# Patient Record
(demographics unavailable — no encounter records)

---

## 2024-12-05 NOTE — HISTORY OF PRESENT ILLNESS
[FreeTextEntry1] : Ms. Chaparro is a 31-year-old female who presents to the clinic for follow-up on obesity class 1 and polycystic ovary syndrome.   OBESITY CLASS 1 / PCOS HISTORY She reported having a ~50 lbs weight gain over 2023. Additionally, she complained of mild facial hair around her chin. She had experienced some menstrual irregularities in early 2023 (delays in her periods of about 6-10 days), which later resolved. She denied having any vision changes, galactorrhea, or changes in the size of her shoes or rings. Labs were remarkable for mildly elevated total testosterone of 56, with normal FSH, LH, prolactin, 17-OH progesterone, DHEA-S, TSH, and lipid profile. A 1-mg dexamethasone suppression test ruled out Cushing's disease. Therefore, the impression was that her symptoms were likely secondary to polycystic ovary syndrome. She decided to have laser hair removal to help with facial hair. She was eventually started on Wegovy as a pharmacotherapy to help with weight loss along with diet and lifestyle changes.   10/2/24 - The patient reports slow but steady weight loss since the last visit, with an additional 4-pound loss on the current 1 mg dose of Wegovy. She has lost a total of 10 lbs since starting the medication at the beginning of the year (after a brief interruption last year due to a shortage). She feels her body has adjusted to the dose, and her weight loss has plateaued. She has 3 more injections of the 1 mg dose remaining before needing a refill. Additionally, she reports increased physical activity, including walking and taking the train instead of driving. She has continued making dietary modifications and limiting food portions. Denies having any side effects from the medication.   12/5/24 - Ms. Chaparro presents for a follow-up regarding weight management, noting her weight has remained somewhat stable since her last appointment. During her previous visit, her Wegovy dose was increased from 1 mg to 1.7 mg weekly. However, due to traveling for a couple of weeks, she did not begin the new dose and continued taking the old dose of 1 mg weekly [place]. She has not yet picked up the higher dose of the medication. Despite the travel, her weight has decreased by 4 lbs since the October visit. She reports a decreased appetite, which she attributes to stress related to an impending job loss. Diet has not changed significantly.

## 2024-12-05 NOTE — REVIEW OF SYSTEMS
[Recent Weight Loss (___ Lbs)] : recent weight loss: [unfilled] lbs [Fatigue] : no fatigue [Fever] : no fever [Chills] : no chills [Chest Pain] : no chest pain [Shortness Of Breath] : no shortness of breath [Nausea] : no nausea [Vomiting] : no vomiting

## 2024-12-05 NOTE — ASSESSMENT
[FreeTextEntry1] : # Obesity class 1 - Ms. Chaparro has shown slow but steady progress with weight loss, losing 4 lbs since the last visit, despite not starting the higher medication dose and having an inconsistent diet due to stress. Since starting the Wegovy in January 2024, she has achieved a 14-pound weight loss and her BMI has decreased from 34 to 31.  - I recommended for her to continue with the current management approach, emphasizing slow, sustained weight loss.  - Increase Wegovy to 1.7 mg as previously planned and monitor for any side effects.  - If weight loss plateaus at the maximum Wegovy dose, will consider switching to Zepbound as a stronger alternative. - Check thyroid function test and lipid profile.   RTC in 2 months

## 2024-12-05 NOTE — PHYSICAL EXAM
[Alert] : alert [Well Nourished] : well nourished [Obese] : obese [No Acute Distress] : no acute distress [Well Developed] : well developed [Normal Sclera/Conjunctiva] : normal sclera/conjunctiva [EOMI] : extra ocular movement intact [No Proptosis] : no proptosis [Thyroid Not Enlarged] : the thyroid was not enlarged [No Thyroid Nodules] : no palpable thyroid nodules [No Respiratory Distress] : no respiratory distress [No Accessory Muscle Use] : no accessory muscle use [Clear to Auscultation] : lungs were clear to auscultation bilaterally [Normal S1, S2] : normal S1 and S2 [Normal Rate] : heart rate was normal [Regular Rhythm] : with a regular rhythm [No Edema] : no peripheral edema [Normal Bowel Sounds] : normal bowel sounds [Not Tender] : non-tender [Not Distended] : not distended [Soft] : abdomen soft [Oriented x3] : oriented to person, place, and time

## 2025-03-20 NOTE — ADDENDUM
[FreeTextEntry1] : Time-Based Billing: I have spent 30 minutes on the encounter. This includes time spent with the patient during the visit as well as time spent before and after the visit reviewing the chart, documenting the encounter, reviewing studies, etc.

## 2025-03-20 NOTE — REVIEW OF SYSTEMS
[Fatigue] : no fatigue [Recent Weight Gain (___ Lbs)] : no recent weight gain [Recent Weight Loss (___ Lbs)] : recent weight loss: [unfilled] lbs [Fever] : no fever [Chills] : no chills [Chest Pain] : no chest pain [Shortness Of Breath] : no shortness of breath [Nausea] : no nausea [Constipation] : no constipation [Vomiting] : no vomiting [Diarrhea] : no diarrhea

## 2025-03-20 NOTE — ASSESSMENT
[FreeTextEntry1] : # Obesity class 1 / PCOS - Ms. Chaparro has experienced successful ongoing weight loss with her current regimen. She has lost 31 lbs since starting the medication, decreasing from 205 lbs (BMI 34.1) to 174 lbs (BMI 28.9).  - Continue Wegovy 1.7 mg weekly. Will monitor her weight loss progress. - PCOS symptoms have improved significantly, now with regular cycles and excessive hair growth resolved.  - She is interested in potentially reducing the dose or discontinuing the medication in the future once she reaches her desire weight.  Explained that she might feel hungrier if the medication is stopped or doses are missed.  Ms. Chaparro expressed confidence in her ability to maintain dietary discipline. - Laboratory tests ordered to assess overall health status. - Follow-up appointment scheduled in four months to reassess progress and medication needs.  RTC in 4 months

## 2025-03-20 NOTE — HISTORY OF PRESENT ILLNESS
[FreeTextEntry1] : Ms. Chaparro is a 32-year-old female who presents to the clinic for follow-up on obesity class 1 and polycystic ovary syndrome.   OBESITY CLASS 1 / PCOS HISTORY She reported having a ~50 lbs weight gain over 2023. Additionally, she complained of mild facial hair around her chin. She had experienced some menstrual irregularities in early 2023 (delays in her periods of about 6-10 days), which later resolved. She denied having any vision changes, galactorrhea, or changes in the size of her shoes or rings. Labs were remarkable for mildly elevated total testosterone of 56, with normal FSH, LH, prolactin, 17-OH progesterone, DHEA-S, TSH, and lipid profile. A 1-mg dexamethasone suppression test ruled out Cushing's disease. Therefore, the impression was that her symptoms were likely secondary to polycystic ovary syndrome. She decided to have laser hair removal to help with facial hair. She was eventually started on Wegovy as a pharmacotherapy to help with weight loss along with diet and lifestyle changes.   10/2/24 - The patient reports slow but steady weight loss since the last visit, with an additional 4-pound loss on the current 1 mg dose of Wegovy. She has lost a total of 10 lbs since starting the medication at the beginning of the year (after a brief interruption last year due to a shortage). She feels her body has adjusted to the dose, and her weight loss has plateaued. She has 3 more injections of the 1 mg dose remaining before needing a refill. Additionally, she reports increased physical activity, including walking and taking the train instead of driving. She has continued making dietary modifications and limiting food portions. Denies having any side effects from the medication.   12/5/24 - Ms. Chaparro presents for a follow-up regarding weight management, noting her weight has remained somewhat stable since her last appointment. During her previous visit, her Wegovy dose was increased from 1 mg to 1.7 mg weekly. However, due to traveling for a couple of weeks, she did not begin the new dose and continued taking the old dose of 1 mg weekly while abroad. She has not yet picked up the higher dose of the medication. Despite the travel, her weight has decreased by 4 lbs since the October visit. She reports a decreased appetite, which she attributes to stress related to an impending job loss. Diet has not changed significantly.   3/20/25 - Ms. Chaparro reports that her weight has decreased from 192 lbs to 174 lbs since her last appointment. She spent three months in [place], where she found the food to be more natural, and she was walking more frequently, which contributed to a significant change in her environment and further weight loss. Initially, she experienced some morning sickness for one weekend after increasing her dose of Wegovy to 1.7 mg weekly; however, she has not had any further episodes since then. She has noticed a reduction in her appetite and feels that taking her medication on an empty stomach helps her avoid side effects. Overall, she has lost approximately 31 lbs since starting Wegovy, decreasing from 205 lbs (BMI 34.1) to 174 lbs (BMI 28.9). Ms. Chaparro reports feeling more like herself again, noting improvements in her PCOS symptoms. Her menstrual periods have returned to normal, and the excessive hair growth previously experienced on her chin and chest has improved significantly.

## 2025-03-20 NOTE — PHYSICAL EXAM
[Alert] : alert [Well Nourished] : well nourished [No Acute Distress] : no acute distress [Well Developed] : well developed [Normal Sclera/Conjunctiva] : normal sclera/conjunctiva [EOMI] : extra ocular movement intact [No Proptosis] : no proptosis [Thyroid Not Enlarged] : the thyroid was not enlarged [No Thyroid Nodules] : no palpable thyroid nodules [No Respiratory Distress] : no respiratory distress [No Accessory Muscle Use] : no accessory muscle use [Clear to Auscultation] : lungs were clear to auscultation bilaterally [Normal S1, S2] : normal S1 and S2 [Normal Rate] : heart rate was normal [Regular Rhythm] : with a regular rhythm [No Edema] : no peripheral edema [Normal Bowel Sounds] : normal bowel sounds [Not Tender] : non-tender [Not Distended] : not distended [Soft] : abdomen soft [Oriented x3] : oriented to person, place, and time